# Patient Record
Sex: FEMALE | Race: WHITE | NOT HISPANIC OR LATINO | Employment: OTHER | URBAN - METROPOLITAN AREA
[De-identification: names, ages, dates, MRNs, and addresses within clinical notes are randomized per-mention and may not be internally consistent; named-entity substitution may affect disease eponyms.]

---

## 2019-04-15 NOTE — PATIENT DISCUSSION
PRESCRIBE SURGERY DROPS PENDING STUDY APPROVAL. IN THE EVENT THE PT DOES NOT PROCEED WITH THE STUDY. PATIENT PREFERS [employer].

## 2019-04-15 NOTE — PATIENT DISCUSSION
PATIENT MAY QUALIFY FOR [facility] RESEARCH STUDY AND AFTER DISCUSSION WOULD LIKE TO RECEIVE MORE INFORMATION BEFORE DECIDING.

## 2019-04-15 NOTE — PATIENT DISCUSSION
Surgery  Counseling: I have discussed the option of glasses versus cataract surgery versus following. It was explained that when vision no longer meets the patient's visual needs and a new prescription for glasses is not likely to improve the patient's visual symptoms, the option of cataract surgery is a reasonable next step. It was explained that there is no guarantee that removing the cataract will improve their visual symptoms, however; it is believed that the cataract is contributing to the patient's visual impairment and surgery may significantly improve both the visual and functional status of the patient. The risks, benefits and alternatives of surgery were discussed with the patient.   After this discussion, the patient desires to proceed with cataract surgery with implantation of an intraocular lens to improve vision and reduce glare during the day and night

## 2019-04-15 NOTE — PATIENT DISCUSSION
DISCUSSED BEST QUALITY OF DISTANCE VISION AND WILL WEAR READING GLASSES AS NEEDED LIKE HE DOES WITH HIS CONTACT LENSES.

## 2019-06-06 NOTE — PATIENT DISCUSSION
REFRACTIVE ERROR- OPTS DISC W/ PT. PT UNDERSTANDS AND ELECTS TO PROCEED W/ REFRACTIVE CATARACT SURGERY.

## 2019-07-16 NOTE — PATIENT DISCUSSION
REFRACTIVE ERROR- OPTS DISC W/ PT. PT UNDERSTANDS AND ELECTS TO PROCEED W/ REFRACTIVE CATARACT SURGERY. PATIENT ELECTS BEST QUALITY OF DISTANCE VISION AND UNDERSTANDS GLASSES WILL BE NEEDED FOR ALL NEAR AND INTERMEDIATE ACTIVITIES.

## 2019-07-25 NOTE — PATIENT DISCUSSION
S/P PC IOL, OU__-  DOING WELL AND STABLE. CONTINUE DROPS AS DIRECTED. FOLLOW UP WITH REFERRING PHYSICIAN.

## 2021-10-29 ENCOUNTER — NEW PATIENT COMPREHENSIVE (OUTPATIENT)
Dept: URBAN - METROPOLITAN AREA CLINIC 40 | Facility: CLINIC | Age: 81
End: 2021-10-29

## 2021-10-29 DIAGNOSIS — H04.123: ICD-10-CM

## 2021-10-29 DIAGNOSIS — H52.03: ICD-10-CM

## 2021-10-29 DIAGNOSIS — H25.813: ICD-10-CM

## 2021-10-29 DIAGNOSIS — H52.4: ICD-10-CM

## 2021-10-29 DIAGNOSIS — H52.203: ICD-10-CM

## 2021-10-29 DIAGNOSIS — H02.403: ICD-10-CM

## 2021-10-29 PROCEDURE — 92004 COMPRE OPH EXAM NEW PT 1/>: CPT

## 2021-10-29 PROCEDURE — 92015 DETERMINE REFRACTIVE STATE: CPT

## 2021-10-29 ASSESSMENT — VISUAL ACUITY
OD_SC: J10
OD_CC: 20/30
OS_SC: J10
OS_BAT: 20/40
OD_BAT: 20/40
OU_CC: J1
OS_CC: 20/40
OD_CC: J2
OS_PH: 20/25-1
OU_SC: J10
OS_CC: J1-
OU_SC: 20/60
OU_CC: 20/30
OS_SC: 20/70-1
OD_SC: 20/70-2

## 2021-10-29 ASSESSMENT — TONOMETRY
OS_IOP_MMHG: 15
OD_IOP_MMHG: 16

## 2024-10-30 ENCOUNTER — COMPREHENSIVE EXAM (OUTPATIENT)
Dept: URBAN - METROPOLITAN AREA CLINIC 38 | Facility: CLINIC | Age: 84
End: 2024-10-30

## 2024-10-30 DIAGNOSIS — H02.403: ICD-10-CM

## 2024-10-30 DIAGNOSIS — H25.813: ICD-10-CM

## 2024-10-30 DIAGNOSIS — H52.203: ICD-10-CM

## 2024-10-30 DIAGNOSIS — H52.03: ICD-10-CM

## 2024-10-30 DIAGNOSIS — H04.123: ICD-10-CM

## 2024-10-30 DIAGNOSIS — H52.4: ICD-10-CM

## 2024-10-30 PROCEDURE — 92014 COMPRE OPH EXAM EST PT 1/>: CPT

## 2024-10-30 PROCEDURE — 92015 DETERMINE REFRACTIVE STATE: CPT
